# Patient Record
Sex: MALE | Race: OTHER | URBAN - METROPOLITAN AREA
[De-identification: names, ages, dates, MRNs, and addresses within clinical notes are randomized per-mention and may not be internally consistent; named-entity substitution may affect disease eponyms.]

---

## 2021-11-30 ENCOUNTER — PREPPED CHART (OUTPATIENT)
Dept: URBAN - METROPOLITAN AREA CLINIC 28 | Facility: CLINIC | Age: 81
End: 2021-11-30

## 2022-04-06 ENCOUNTER — ESTABLISHED PATIENT (OUTPATIENT)
Dept: URBAN - METROPOLITAN AREA CLINIC 28 | Facility: CLINIC | Age: 82
End: 2022-04-06

## 2022-04-06 DIAGNOSIS — H35.363: ICD-10-CM

## 2022-04-06 DIAGNOSIS — H04.123: ICD-10-CM

## 2022-04-06 DIAGNOSIS — H25.13: ICD-10-CM

## 2022-04-06 DIAGNOSIS — H52.03: ICD-10-CM

## 2022-04-06 PROCEDURE — 92015 DETERMINE REFRACTIVE STATE: CPT

## 2022-04-06 PROCEDURE — 92014 COMPRE OPH EXAM EST PT 1/>: CPT

## 2022-04-06 PROCEDURE — 92134 CPTRZ OPH DX IMG PST SGM RTA: CPT

## 2022-04-06 ASSESSMENT — VISUAL ACUITY
OD_CC: 20/25
OS_CC: 20/30
OU_CC: 20/20-2

## 2022-04-06 ASSESSMENT — KERATOMETRY
OD_AXISANGLE2_DEGREES: 79
OD_K2POWER_DIOPTERS: 42.25
OD_AXISANGLE_DEGREES: 169
OS_K1POWER_DIOPTERS: 43.75
OS_AXISANGLE_DEGREES: 168
OS_K2POWER_DIOPTERS: 42.75
OS_AXISANGLE2_DEGREES: 78
OD_K1POWER_DIOPTERS: 43.25

## 2022-04-06 ASSESSMENT — TONOMETRY
OD_IOP_MMHG: 16
OS_IOP_MMHG: 16
OD_IOP_MMHG: 16
OS_IOP_MMHG: 16

## 2022-04-06 NOTE — PATIENT DISCUSSION
Advised cataract consult. Ideally mf IOL or extended depth of focus IOL, if standard IOL aim for PORE of -0.25/-0.50 to help w a little w intermediate.